# Patient Record
Sex: FEMALE | HISPANIC OR LATINO | URBAN - METROPOLITAN AREA
[De-identification: names, ages, dates, MRNs, and addresses within clinical notes are randomized per-mention and may not be internally consistent; named-entity substitution may affect disease eponyms.]

---

## 2024-09-18 ENCOUNTER — APPOINTMENT (RX ONLY)
Dept: URBAN - METROPOLITAN AREA CLINIC 15 | Facility: CLINIC | Age: 59
Setting detail: DERMATOLOGY
End: 2024-09-18

## 2024-09-18 VITALS — HEIGHT: 66 IN | WEIGHT: 158 LBS

## 2024-09-18 DIAGNOSIS — L81.9 DISORDER OF PIGMENTATION, UNSPECIFIED: ICD-10-CM

## 2024-09-18 DIAGNOSIS — L81.0 POSTINFLAMMATORY HYPERPIGMENTATION: ICD-10-CM

## 2024-09-18 DIAGNOSIS — L57.8 OTHER SKIN CHANGES DUE TO CHRONIC EXPOSURE TO NONIONIZING RADIATION: ICD-10-CM

## 2024-09-18 PROCEDURE — 99203 OFFICE O/P NEW LOW 30 MIN: CPT

## 2024-09-18 PROCEDURE — ? COUNSELING

## 2024-09-18 PROCEDURE — ? PRESCRIPTION

## 2024-09-18 PROCEDURE — ? PRESCRIPTION MEDICATION MANAGEMENT

## 2024-09-18 RX ORDER — TACROLIMUS 0.3 MG/G
1 OINTMENT TOPICAL BID
Qty: 60 | Refills: 0 | Status: ERX | COMMUNITY
Start: 2024-09-18

## 2024-09-18 RX ORDER — HYDROCORTISONE 25 MG/G
CREAM TOPICAL BID
Qty: 30 | Refills: 0 | Status: ERX | COMMUNITY
Start: 2024-09-18

## 2024-09-18 RX ADMIN — HYDROCORTISONE: 25 CREAM TOPICAL at 00:00

## 2024-09-18 RX ADMIN — TACROLIMUS 1: 0.3 OINTMENT TOPICAL at 00:00

## 2024-09-18 ASSESSMENT — LOCATION SIMPLE DESCRIPTION DERM
LOCATION SIMPLE: RIGHT CHEEK
LOCATION SIMPLE: LEFT CHEEK
LOCATION SIMPLE: LEFT CHEEK
LOCATION SIMPLE: RIGHT CHEEK
LOCATION SIMPLE: LEFT KNEE

## 2024-09-18 ASSESSMENT — LOCATION ZONE DERM
LOCATION ZONE: FACE
LOCATION ZONE: FACE
LOCATION ZONE: LEG

## 2024-09-18 ASSESSMENT — LOCATION DETAILED DESCRIPTION DERM
LOCATION DETAILED: LEFT MEDIAL MALAR CHEEK
LOCATION DETAILED: LEFT INFERIOR CENTRAL MALAR CHEEK
LOCATION DETAILED: RIGHT INFERIOR CENTRAL MALAR CHEEK
LOCATION DETAILED: LEFT INFERIOR CENTRAL MALAR CHEEK
LOCATION DETAILED: RIGHT INFERIOR CENTRAL MALAR CHEEK
LOCATION DETAILED: LEFT KNEE
LOCATION DETAILED: RIGHT CENTRAL MALAR CHEEK

## 2024-09-18 NOTE — HPI: DISCOLORATION
How Severe Is Your Skin Discoloration?: moderate
Additional History: Patient reports discoloration on her right lower extremity. Patient reports an itchy nodular skin lesion prior to the discoloration.
Additional History: Patient reports discoloration spots on her face. Patient reports onset of approximately 6 months.

## 2024-09-18 NOTE — PROCEDURE: COUNSELING
Detail Level: Detailed
Detail Level: Zone
Topical Retinoids Recommendations: Sunscreen mineral SPF 30-50 with zinc oxide and titanium dioxide: [employer]oche Posay Anthelios

## 2024-09-18 NOTE — PROCEDURE: PRESCRIPTION MEDICATION MANAGEMENT
Render In Strict Bullet Format?: No
Initiate Treatment: .\\n\\nhydrocortisone 2.5 % topical cream: Apply a small amount to the affected areas of the left interior knee, twice daily for 2 weeks.\\n\\n** AFTER 2 WEEKS **\\nPatient may apply: \\n\\ntacrolimus 0.03 % topical: Apply a small amount to the left interior knee, twice a day for 3-4 weeks.
Detail Level: Zone
Plan: .\\n\\nHydroquinone cream after summer, if no improvement.
Samples Given: Melab3 serum, AlphaRet
Initiate Treatment: .\\n\\n* Regimen: Non-Comedogenic oil free products  \\nAm \\n\\n- Wash face every morning with gentle cleanser \\n- MelaB 3: Apply a small amount to the face, twice daily. \\n- SkinCeuticals Vitamin C Serum: Apply a small amount to the face, once daily. \\n- Apply Mineral SPF 30-50 the to face, neck and decolletage daily ( ingredients: Zinc Oxide, Titanium Dioxide). \\n\\nPM \\n- Wash face every night with gentle cleanser\\n- MelaB 3: Apply a small amount to the face, twice daily.\\n- Apply Moisturizer to the face every night. \\n- Skin Better Alpharet overnight cream apply a pea size amount to the face twice a week at night ( Monday and Thursday). Increase as tolerated. Avoid corners of the eyes, nose, and lips. \\n\\n\\n\\n** Topical retinoid medications often make the skin more sensitive to the sun. It is recommended that you use a non-comedogenic sunscreen during the day while using topical retinod therapy. Retinoid medication are not recommended for used to in pregnancy. It is important to discontinue use as soon as pregnancy is confirmed and to consult your dermatologist and/or OBGYN if you’re currently using retinol medication and are considering becoming pregnant in the near future.

## 2024-10-30 ENCOUNTER — APPOINTMENT (RX ONLY)
Dept: URBAN - METROPOLITAN AREA CLINIC 15 | Facility: CLINIC | Age: 59
Setting detail: DERMATOLOGY
End: 2024-10-30

## 2024-10-30 VITALS — WEIGHT: 158 LBS | HEIGHT: 66 IN

## 2024-10-30 DIAGNOSIS — L57.8 OTHER SKIN CHANGES DUE TO CHRONIC EXPOSURE TO NONIONIZING RADIATION: ICD-10-CM

## 2024-10-30 DIAGNOSIS — L81.9 DISORDER OF PIGMENTATION, UNSPECIFIED: ICD-10-CM

## 2024-10-30 DIAGNOSIS — L81.0 POSTINFLAMMATORY HYPERPIGMENTATION: ICD-10-CM

## 2024-10-30 PROCEDURE — ? PRESCRIPTION

## 2024-10-30 PROCEDURE — ? DEFER

## 2024-10-30 PROCEDURE — ? SUNSCREEN RECOMMENDATIONS

## 2024-10-30 PROCEDURE — ? COUNSELING

## 2024-10-30 PROCEDURE — 99213 OFFICE O/P EST LOW 20 MIN: CPT

## 2024-10-30 PROCEDURE — ? PRESCRIPTION MEDICATION MANAGEMENT

## 2024-10-30 RX ORDER — TRIAMCINOLONE ACETONIDE 1 MG/G
1 CREAM TOPICAL BID
Qty: 80 | Refills: 0 | Status: ERX | COMMUNITY
Start: 2024-10-30

## 2024-10-30 RX ORDER — TACROLIMUS 1 MG/G
1 OINTMENT TOPICAL BID
Qty: 30 | Refills: 1 | Status: ERX | COMMUNITY
Start: 2024-10-30

## 2024-10-30 RX ADMIN — TACROLIMUS 1: 1 OINTMENT TOPICAL at 00:00

## 2024-10-30 RX ADMIN — TRIAMCINOLONE ACETONIDE 1: 1 CREAM TOPICAL at 00:00

## 2024-10-30 ASSESSMENT — LOCATION DETAILED DESCRIPTION DERM
LOCATION DETAILED: LEFT INFERIOR CENTRAL MALAR CHEEK
LOCATION DETAILED: LEFT INFERIOR CENTRAL MALAR CHEEK
LOCATION DETAILED: RIGHT INFERIOR CENTRAL MALAR CHEEK
LOCATION DETAILED: LEFT CENTRAL MALAR CHEEK
LOCATION DETAILED: RIGHT LATERAL MALAR CHEEK
LOCATION DETAILED: LEFT KNEE
LOCATION DETAILED: RIGHT INFERIOR CENTRAL MALAR CHEEK

## 2024-10-30 ASSESSMENT — LOCATION ZONE DERM
LOCATION ZONE: LEG
LOCATION ZONE: FACE
LOCATION ZONE: FACE

## 2024-10-30 ASSESSMENT — LOCATION SIMPLE DESCRIPTION DERM
LOCATION SIMPLE: RIGHT CHEEK
LOCATION SIMPLE: RIGHT CHEEK
LOCATION SIMPLE: LEFT CHEEK
LOCATION SIMPLE: LEFT KNEE
LOCATION SIMPLE: LEFT CHEEK

## 2024-10-30 NOTE — PROCEDURE: DEFER
Size Of Lesion In Cm (Optional): 0
Introduction Text (Please End With A Colon): The following procedure was deferred:
Other Procedure: Hydroquinone
Detail Level: Detailed

## 2024-10-30 NOTE — PROCEDURE: PRESCRIPTION MEDICATION MANAGEMENT
Render In Strict Bullet Format?: No
Continue Regimen: .\\n\\n* Regimen: Non-Comedogenic oil free products  \\nAm \\n\\n- Wash face every morning with gentle cleanser (Neutragena Hydrating Gel cleanser samples given to samples)\\n- MelaB 3: Apply a small amount to the face, twice daily. (Samples given in office) \\n- La Roche Posay Vitamin C Serum: Apply a small amount to the face, once daily. (Samples given in office) \\n- Apply Mineral SPF 30-50 the to face, neck and decolletage daily ( ingredients: Zinc Oxide, Titanium Dioxide). (- La Roche Posay Anthelious SPF apply a small amount to face, and neck every morning. re-apply 2-3 hours if exposed to the sun. Samples given in office)\\n\\nPM \\n- Wash face every night with gentle cleanser\\n- MelaB 3: Apply a small amount to the face, twice daily.\\n- Apply Moisturizer to the face every night. \\n- Skin Better Alpharet overnight cream apply a pea size amount to the face twice a week at night ( Monday and Thursday). Increase as tolerated. Avoid corners of the eyes, nose, and lips. \\n\\n\\n\\n** Topical retinoid medications often make the skin more sensitive to the sun. It is recommended that you use a non-comedogenic sunscreen during the day while using topical retinod therapy. Retinoid medication are not recommended for used to in pregnancy. It is important to discontinue use as soon as pregnancy is confirmed and to consult your dermatologist and/or OBGYN if you’re currently using retinol medication and are considering becoming pregnant in the near future.\\n\\n.
Samples Given: Melab3 serum, Anthelios Sunscreen, Gel Cleanser, vitamin c serum
Detail Level: Zone
Initiate Treatment: .\\n\\n** FOR THE FIRST TWO WEEKS **\\n- triamcinolone acetonide 0.1 % topical cream: Apply to affected areas on the left knee, twice daily for two weeks.\\n\\n** AFTER TWO WEEKS BEGIN TO APPLY THE FOLLOWING **\\n- tacrolimus 0.1 % topical ointment: Apply to affected areas on left knee twice daily, for 6 weeks.\\n\\n.
Discontinue Regimen: .\\n\\nhydrocortisone 2.5 % topical cream: Apply a small amount to the affected areas of the left interior knee, twice daily for 2 weeks.\\n\\n** AFTER 2 WEEKS **\\nPatient may apply: \\n\\ntacrolimus 0.03 % topical: Apply a small amount to the left interior knee, twice a day for 3-4 weeks.\\n\\n.

## 2024-10-30 NOTE — PROCEDURE: SUNSCREEN RECOMMENDATIONS
Detail Level: Detailed
General Sunscreen Counseling: I recommended a broad spectrum sunscreen with a SPF of 30 or higher.  I explained that SPF 30 sunscreens block approximately 97 percent of the sun's harmful rays.  Sunscreens should be applied at least 15 minutes prior to expected sun exposure and then every 2 hours after that as long as sun exposure continues. If swimming or exercising sunscreen should be reapplied every 45 minutes to an hour after getting wet or sweating.  One ounce, or the equivalent of a shot glass full of sunscreen, is adequate to protect the skin not covered by a bathing suit. I also recommended a lip balm with a sunscreen as well. Sun protective clothing can be used in lieu of sunscreen but must be worn the entire time you are exposed to the sun's rays.
Products Recommended: -Sunscreen mineral SPF 30-50 with zinc oxide and titanium dioxide oil free and non comedogenic. *La RochePosey Anthelios **

## 2025-01-16 ENCOUNTER — APPOINTMENT (OUTPATIENT)
Dept: URBAN - METROPOLITAN AREA CLINIC 15 | Facility: CLINIC | Age: 60
Setting detail: DERMATOLOGY
End: 2025-01-16

## 2025-01-16 VITALS — HEIGHT: 66 IN | WEIGHT: 158 LBS

## 2025-01-16 DIAGNOSIS — L81.0 POSTINFLAMMATORY HYPERPIGMENTATION: ICD-10-CM | Status: RESOLVED

## 2025-01-16 DIAGNOSIS — L81.9 DISORDER OF PIGMENTATION, UNSPECIFIED: ICD-10-CM

## 2025-01-16 PROCEDURE — ? DEFER

## 2025-01-16 PROCEDURE — ? DIAGNOSIS COMMENT

## 2025-01-16 PROCEDURE — ? COUNSELING

## 2025-01-16 PROCEDURE — 99213 OFFICE O/P EST LOW 20 MIN: CPT

## 2025-01-16 PROCEDURE — ? PRESCRIPTION MEDICATION MANAGEMENT

## 2025-01-16 ASSESSMENT — LOCATION SIMPLE DESCRIPTION DERM
LOCATION SIMPLE: LEFT KNEE
LOCATION SIMPLE: LEFT CHEEK
LOCATION SIMPLE: RIGHT CHEEK
LOCATION SIMPLE: LEFT CHEEK
LOCATION SIMPLE: RIGHT CHEEK

## 2025-01-16 ASSESSMENT — SEVERITY ASSESSMENT
SEVERITY: CLEAR
SEVERITY: MILD TO MODERATE

## 2025-01-16 ASSESSMENT — LOCATION ZONE DERM
LOCATION ZONE: FACE
LOCATION ZONE: FACE
LOCATION ZONE: LEG

## 2025-01-16 ASSESSMENT — LOCATION DETAILED DESCRIPTION DERM
LOCATION DETAILED: LEFT KNEE
LOCATION DETAILED: RIGHT INFERIOR CENTRAL MALAR CHEEK
LOCATION DETAILED: RIGHT INFERIOR CENTRAL MALAR CHEEK
LOCATION DETAILED: LEFT INFERIOR CENTRAL MALAR CHEEK
LOCATION DETAILED: LEFT INFERIOR CENTRAL MALAR CHEEK

## 2025-01-16 ASSESSMENT — BSA RASH: BSA RASH: 1

## 2025-01-16 NOTE — PROCEDURE: DIAGNOSIS COMMENT
Render Risk Assessment In Note?: no
Comment: Patient has dryness on the skin. No need for alpharet a5 this time.
Detail Level: Simple

## 2025-01-16 NOTE — PROCEDURE: PRESCRIPTION MEDICATION MANAGEMENT
Defer Treatment (Provide Reason For Deferment In Text Field Below): .\\n\\nDue to prices: \\n\\n- Skin Better Alpharet overnight cream apply a pea size amount to the face twice a week at night
Render In Strict Bullet Format?: No
Continue Regimen: .\\n\\n* Regimen: Non-Comedogenic oil free products  \\nAm \\n\\n- Wash face every morning with gentle cleanser (Neutragena Hydrating Gel cleanser samples given to samples)\\n- La Roche Posay Roxanne B3 serum apply a small amount to the affected areas on the face twice daily.  \\n- La Roche Posay Vitamin C Serum: Apply a small amount to the face, once daily. \\n- Apply Mineral SPF 30-50 the to face, neck and decolletage daily ( ingredients: Zinc Oxide, Titanium Dioxide). (- La Roche Posay Anthelious SPF apply a small amount to face, and neck every morning. re-apply 2-3 hours if exposed to the sun. Samples given in office)\\n\\nPM \\n- Wash face every night with gentle cleanser\\n- La Roche Posay Roxanne B3 serum apply a small amount to the affected areas on the face twice daily. .\\n- Apply Moisturizer to the face every night. \\n\\n\\n** Topical retinoid medications often make the skin more sensitive to the sun. It is recommended that you use a non-comedogenic sunscreen during the day while using topical retinod therapy. Retinoid medication are not recommended for used to in pregnancy. It is important to discontinue use as soon as pregnancy is confirmed and to consult your dermatologist and/or OBGYN if you’re currently using retinol medication and are considering becoming pregnant in the near future.\\n\\n.
Samples Given: .\\n\\n- La Roche Posay hydrating cleanser, La Roche Posay Anthelious SPF, La Roche Posay Roxanne B3 serum. La Roche Posay vitamin c, cerave PM . \\n\\n.
Detail Level: Zone